# Patient Record
Sex: FEMALE | Race: WHITE
[De-identification: names, ages, dates, MRNs, and addresses within clinical notes are randomized per-mention and may not be internally consistent; named-entity substitution may affect disease eponyms.]

---

## 2019-12-03 ENCOUNTER — HOSPITAL ENCOUNTER (EMERGENCY)
Dept: HOSPITAL 8 - ED | Age: 49
Discharge: HOME | End: 2019-12-03
Payer: COMMERCIAL

## 2019-12-03 VITALS — BODY MASS INDEX: 27.49 KG/M2 | WEIGHT: 171.08 LBS | HEIGHT: 66 IN

## 2019-12-03 VITALS — DIASTOLIC BLOOD PRESSURE: 90 MMHG | SYSTOLIC BLOOD PRESSURE: 146 MMHG

## 2019-12-03 DIAGNOSIS — S61.412A: Primary | ICD-10-CM
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
## 2019-12-03 PROCEDURE — 99281 EMR DPT VST MAYX REQ PHY/QHP: CPT

## 2020-08-09 ENCOUNTER — HOSPITAL ENCOUNTER (EMERGENCY)
Dept: HOSPITAL 8 - ED | Age: 50
Discharge: HOME | End: 2020-08-09
Payer: SELF-PAY

## 2020-08-09 VITALS — WEIGHT: 158.73 LBS | BODY MASS INDEX: 25.51 KG/M2 | HEIGHT: 66 IN

## 2020-08-09 DIAGNOSIS — F10.120: Primary | ICD-10-CM
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
## 2020-08-09 PROCEDURE — 99283 EMERGENCY DEPT VISIT LOW MDM: CPT

## 2020-08-09 NOTE — NUR
TASK RN: PT TRYING TO GET OUT OF BED, REDIRECTED TO LAY BACK DOWN, PT COMPLIED 
AT THIS TIME. FALL PRECAUTIONS IN PLACE.
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
